# Patient Record
Sex: MALE | Race: WHITE | NOT HISPANIC OR LATINO | Employment: OTHER | URBAN - METROPOLITAN AREA
[De-identification: names, ages, dates, MRNs, and addresses within clinical notes are randomized per-mention and may not be internally consistent; named-entity substitution may affect disease eponyms.]

---

## 2023-06-17 LAB
ALANINE AMINOTRANSFERASE (SGPT) (U/L) IN SER/PLAS: 26 U/L (ref 10–52)
ALBUMIN (G/DL) IN SER/PLAS: 4.3 G/DL (ref 3.4–5)
ALKALINE PHOSPHATASE (U/L) IN SER/PLAS: 51 U/L (ref 33–136)
ANION GAP IN SER/PLAS: 13 MMOL/L (ref 10–20)
APPEARANCE, URINE: NORMAL
ASPARTATE AMINOTRANSFERASE (SGOT) (U/L) IN SER/PLAS: 20 U/L (ref 9–39)
BASOPHILS (10*3/UL) IN BLOOD BY AUTOMATED COUNT: 0.07 X10E9/L (ref 0–0.1)
BASOPHILS/100 LEUKOCYTES IN BLOOD BY AUTOMATED COUNT: 1.1 % (ref 0–2)
BILIRUBIN TOTAL (MG/DL) IN SER/PLAS: 0.4 MG/DL (ref 0–1.2)
BILIRUBIN, URINE: NEGATIVE
BLOOD, URINE: NEGATIVE
CALCIUM (MG/DL) IN SER/PLAS: 9.8 MG/DL (ref 8.6–10.6)
CARBON DIOXIDE, TOTAL (MMOL/L) IN SER/PLAS: 25 MMOL/L (ref 21–32)
CHLORIDE (MMOL/L) IN SER/PLAS: 105 MMOL/L (ref 98–107)
CHOLESTEROL (MG/DL) IN SER/PLAS: 210 MG/DL (ref 0–199)
CHOLESTEROL IN HDL (MG/DL) IN SER/PLAS: 55.6 MG/DL
CHOLESTEROL/HDL RATIO: 3.8
COLOR, URINE: NORMAL
CREATININE (MG/DL) IN SER/PLAS: 1 MG/DL (ref 0.5–1.3)
EOSINOPHILS (10*3/UL) IN BLOOD BY AUTOMATED COUNT: 0.41 X10E9/L (ref 0–0.7)
EOSINOPHILS/100 LEUKOCYTES IN BLOOD BY AUTOMATED COUNT: 6.2 % (ref 0–6)
ERYTHROCYTE DISTRIBUTION WIDTH (RATIO) BY AUTOMATED COUNT: 13 % (ref 11.5–14.5)
ERYTHROCYTE MEAN CORPUSCULAR HEMOGLOBIN CONCENTRATION (G/DL) BY AUTOMATED: 32.2 G/DL (ref 32–36)
ERYTHROCYTE MEAN CORPUSCULAR VOLUME (FL) BY AUTOMATED COUNT: 88 FL (ref 80–100)
ERYTHROCYTES (10*6/UL) IN BLOOD BY AUTOMATED COUNT: 5.18 X10E12/L (ref 4.5–5.9)
GFR MALE: 82 ML/MIN/1.73M2
GLUCOSE (MG/DL) IN SER/PLAS: 119 MG/DL (ref 74–99)
GLUCOSE, URINE: NEGATIVE MG/DL
HEMATOCRIT (%) IN BLOOD BY AUTOMATED COUNT: 45.7 % (ref 41–52)
HEMOGLOBIN (G/DL) IN BLOOD: 14.7 G/DL (ref 13.5–17.5)
IMMATURE GRANULOCYTES/100 LEUKOCYTES IN BLOOD BY AUTOMATED COUNT: 0.3 % (ref 0–0.9)
KETONES, URINE: NEGATIVE MG/DL
LDL: 127 MG/DL (ref 0–99)
LEUKOCYTE ESTERASE, URINE: NEGATIVE
LEUKOCYTES (10*3/UL) IN BLOOD BY AUTOMATED COUNT: 6.6 X10E9/L (ref 4.4–11.3)
LYMPHOCYTES (10*3/UL) IN BLOOD BY AUTOMATED COUNT: 2.05 X10E9/L (ref 1.2–4.8)
LYMPHOCYTES/100 LEUKOCYTES IN BLOOD BY AUTOMATED COUNT: 30.9 % (ref 13–44)
MONOCYTES (10*3/UL) IN BLOOD BY AUTOMATED COUNT: 0.65 X10E9/L (ref 0.1–1)
MONOCYTES/100 LEUKOCYTES IN BLOOD BY AUTOMATED COUNT: 9.8 % (ref 2–10)
NEUTROPHILS (10*3/UL) IN BLOOD BY AUTOMATED COUNT: 3.43 X10E9/L (ref 1.2–7.7)
NEUTROPHILS/100 LEUKOCYTES IN BLOOD BY AUTOMATED COUNT: 51.7 % (ref 40–80)
NITRITE, URINE: NEGATIVE
NRBC (PER 100 WBCS) BY AUTOMATED COUNT: 0 /100 WBC (ref 0–0)
PH, URINE: 5 (ref 5–8)
PLATELETS (10*3/UL) IN BLOOD AUTOMATED COUNT: 246 X10E9/L (ref 150–450)
POTASSIUM (MMOL/L) IN SER/PLAS: 4.3 MMOL/L (ref 3.5–5.3)
PROSTATE SPECIFIC AG (NG/ML) IN SER/PLAS: 0.66 NG/ML (ref 0–4)
PROTEIN TOTAL: 7 G/DL (ref 6.4–8.2)
PROTEIN, URINE: NEGATIVE MG/DL
SODIUM (MMOL/L) IN SER/PLAS: 139 MMOL/L (ref 136–145)
SPECIFIC GRAVITY, URINE: 1.02 (ref 1–1.03)
THYROTROPIN (MIU/L) IN SER/PLAS BY DETECTION LIMIT <= 0.05 MIU/L: 1.66 MIU/L (ref 0.44–3.98)
TRIGLYCERIDE (MG/DL) IN SER/PLAS: 135 MG/DL (ref 0–149)
UREA NITROGEN (MG/DL) IN SER/PLAS: 25 MG/DL (ref 6–23)
UROBILINOGEN, URINE: <2 MG/DL (ref 0–1.9)
VLDL: 27 MG/DL (ref 0–40)

## 2023-06-19 LAB
ESTIMATED AVERAGE GLUCOSE FOR HBA1C: 137 MG/DL
HEMOGLOBIN A1C/HEMOGLOBIN TOTAL IN BLOOD: 6.4 %

## 2023-06-23 LAB
TESTOSTERONE FREE (CHAN): 50 PG/ML (ref 35–155)
TESTOSTERONE,TOTAL,LC-MS/MS: 435 NG/DL (ref 250–1100)

## 2023-11-22 DIAGNOSIS — J31.0 CHRONIC RHINITIS: ICD-10-CM

## 2023-11-22 RX ORDER — FLUTICASONE PROPIONATE 50 MCG
SPRAY, SUSPENSION (ML) NASAL
Qty: 16 G | Refills: 0 | Status: SHIPPED | OUTPATIENT
Start: 2023-11-22 | End: 2024-02-05

## 2023-12-07 ENCOUNTER — OFFICE VISIT (OUTPATIENT)
Dept: PRIMARY CARE | Facility: CLINIC | Age: 68
End: 2023-12-07
Payer: MEDICARE

## 2023-12-07 VITALS
SYSTOLIC BLOOD PRESSURE: 130 MMHG | WEIGHT: 231.4 LBS | TEMPERATURE: 96.9 F | BODY MASS INDEX: 35.18 KG/M2 | OXYGEN SATURATION: 97 % | HEART RATE: 65 BPM | DIASTOLIC BLOOD PRESSURE: 82 MMHG

## 2023-12-07 DIAGNOSIS — R73.02 IGT (IMPAIRED GLUCOSE TOLERANCE): ICD-10-CM

## 2023-12-07 DIAGNOSIS — M25.551 PAIN OF RIGHT HIP: ICD-10-CM

## 2023-12-07 DIAGNOSIS — Z00.00 WELLNESS EXAMINATION: ICD-10-CM

## 2023-12-07 DIAGNOSIS — R73.03 PREDIABETES: ICD-10-CM

## 2023-12-07 DIAGNOSIS — G47.33 OBSTRUCTIVE SLEEP APNEA OF ADULT: ICD-10-CM

## 2023-12-07 DIAGNOSIS — E78.2 MIXED HYPERLIPIDEMIA: Primary | ICD-10-CM

## 2023-12-07 PROBLEM — N52.01 ERECTILE DYSFUNCTION DUE TO ARTERIAL INSUFFICIENCY: Status: ACTIVE | Noted: 2017-04-26

## 2023-12-07 PROBLEM — H90.3 ASYMMETRICAL SENSORINEURAL HEARING LOSS: Status: ACTIVE | Noted: 2023-12-07

## 2023-12-07 PROBLEM — N42.9 DISEASE OF PROSTATE: Status: ACTIVE | Noted: 2021-12-13

## 2023-12-07 PROCEDURE — 1159F MED LIST DOCD IN RCRD: CPT | Performed by: INTERNAL MEDICINE

## 2023-12-07 PROCEDURE — 99213 OFFICE O/P EST LOW 20 MIN: CPT | Performed by: INTERNAL MEDICINE

## 2023-12-07 RX ORDER — VENLAFAXINE HYDROCHLORIDE 75 MG/1
75 CAPSULE, EXTENDED RELEASE ORAL DAILY
COMMUNITY
Start: 2023-12-04

## 2023-12-07 RX ORDER — ERGOCALCIFEROL 1.25 MG/1
50000 CAPSULE ORAL
COMMUNITY

## 2023-12-07 RX ORDER — EZETIMIBE 10 MG/1
10 TABLET ORAL DAILY
Qty: 90 TABLET | Refills: 1 | Status: SHIPPED | OUTPATIENT
Start: 2023-12-07

## 2023-12-07 RX ORDER — VENLAFAXINE HYDROCHLORIDE 150 MG/1
150 CAPSULE, EXTENDED RELEASE ORAL 2 TIMES DAILY
COMMUNITY

## 2023-12-07 RX ORDER — FLUTICASONE PROPIONATE 50 MCG
2 SPRAY, SUSPENSION (ML) NASAL DAILY
COMMUNITY
Start: 2018-04-12

## 2023-12-07 RX ORDER — TADALAFIL 5 MG/1
5 TABLET ORAL
COMMUNITY
Start: 2011-11-29 | End: 2023-12-07 | Stop reason: SDUPTHER

## 2023-12-07 RX ORDER — EZETIMIBE 10 MG/1
10 TABLET ORAL DAILY
COMMUNITY
End: 2023-12-07 | Stop reason: SDUPTHER

## 2023-12-07 RX ORDER — OMEGA-3/DHA/EPA/FISH OIL 300-1000MG
CAPSULE,DELAYED RELEASE (ENTERIC COATED) ORAL
COMMUNITY
Start: 2012-10-02

## 2023-12-07 RX ORDER — TADALAFIL 5 MG/1
5 TABLET ORAL
Qty: 90 TABLET | Refills: 3 | Status: SHIPPED | OUTPATIENT
Start: 2023-12-07 | End: 2036-01-24

## 2023-12-07 NOTE — PROGRESS NOTES
Subjective   Patient ID: Hernando Stone is a 68 y.o. male who presents for Follow-up (6 mo).    HPI lipids  Sugars   Ed  Rhinitis   Sp carpal       Review of Systems    Objective   /82   Pulse 65   Temp 36.1 °C (96.9 °F)   Wt 105 kg (231 lb 6.4 oz)   SpO2 97%   BMI 35.18 kg/m²     Physical Exam  Constitutional:       Appearance: Normal appearance. He is normal weight.   HENT:      Head: Normocephalic.      Right Ear: External ear normal.      Left Ear: External ear normal.      Nose: Nose normal.   Cardiovascular:      Rate and Rhythm: Normal rate and regular rhythm.      Pulses: Normal pulses.   Pulmonary:      Effort: Pulmonary effort is normal.      Breath sounds: Normal breath sounds.   Musculoskeletal:      Cervical back: Normal range of motion.   Neurological:      Mental Status: He is alert.         Assessment/Plan   Diagnoses and all orders for this visit:  Mixed hyperlipidemia  IGT (impaired glucose tolerance)  Pain of right hip  Obstructive sleep apnea of adult  Comments:  on cpap

## 2023-12-14 ENCOUNTER — LAB (OUTPATIENT)
Dept: LAB | Facility: LAB | Age: 68
End: 2023-12-14
Payer: MEDICARE

## 2023-12-14 ENCOUNTER — ANCILLARY PROCEDURE (OUTPATIENT)
Dept: RADIOLOGY | Facility: CLINIC | Age: 68
End: 2023-12-14
Payer: MEDICARE

## 2023-12-14 DIAGNOSIS — M25.551 PAIN OF RIGHT HIP: ICD-10-CM

## 2023-12-14 DIAGNOSIS — Z00.00 WELLNESS EXAMINATION: ICD-10-CM

## 2023-12-14 DIAGNOSIS — E78.2 MIXED HYPERLIPIDEMIA: ICD-10-CM

## 2023-12-14 DIAGNOSIS — R73.03 PREDIABETES: ICD-10-CM

## 2023-12-14 DIAGNOSIS — R73.02 IGT (IMPAIRED GLUCOSE TOLERANCE): ICD-10-CM

## 2023-12-14 DIAGNOSIS — G47.33 OBSTRUCTIVE SLEEP APNEA OF ADULT: ICD-10-CM

## 2023-12-14 PROCEDURE — 80053 COMPREHEN METABOLIC PANEL: CPT

## 2023-12-14 PROCEDURE — 80061 LIPID PANEL: CPT

## 2023-12-14 PROCEDURE — 36415 COLL VENOUS BLD VENIPUNCTURE: CPT

## 2023-12-14 PROCEDURE — 83036 HEMOGLOBIN GLYCOSYLATED A1C: CPT

## 2023-12-14 PROCEDURE — 73502 X-RAY EXAM HIP UNI 2-3 VIEWS: CPT | Mod: RIGHT SIDE | Performed by: RADIOLOGY

## 2023-12-14 PROCEDURE — 73502 X-RAY EXAM HIP UNI 2-3 VIEWS: CPT | Mod: RT,FY

## 2023-12-15 LAB
ALBUMIN SERPL BCP-MCNC: 4.5 G/DL (ref 3.4–5)
ALP SERPL-CCNC: 46 U/L (ref 33–136)
ALT SERPL W P-5'-P-CCNC: 31 U/L (ref 10–52)
ANION GAP SERPL CALC-SCNC: 13 MMOL/L (ref 10–20)
AST SERPL W P-5'-P-CCNC: 26 U/L (ref 9–39)
BILIRUB SERPL-MCNC: 0.5 MG/DL (ref 0–1.2)
BUN SERPL-MCNC: 22 MG/DL (ref 6–23)
CALCIUM SERPL-MCNC: 9.7 MG/DL (ref 8.6–10.6)
CHLORIDE SERPL-SCNC: 103 MMOL/L (ref 98–107)
CHOLEST SERPL-MCNC: 204 MG/DL (ref 0–199)
CHOLESTEROL/HDL RATIO: 3.4
CO2 SERPL-SCNC: 28 MMOL/L (ref 21–32)
CREAT SERPL-MCNC: 1.04 MG/DL (ref 0.5–1.3)
EST. AVERAGE GLUCOSE BLD GHB EST-MCNC: 137 MG/DL
GFR SERPL CREATININE-BSD FRML MDRD: 78 ML/MIN/1.73M*2
GLUCOSE SERPL-MCNC: 100 MG/DL (ref 74–99)
HBA1C MFR BLD: 6.4 %
HDLC SERPL-MCNC: 60.4 MG/DL
LDLC SERPL CALC-MCNC: 122 MG/DL
NON HDL CHOLESTEROL: 144 MG/DL (ref 0–149)
POTASSIUM SERPL-SCNC: 4.3 MMOL/L (ref 3.5–5.3)
PROT SERPL-MCNC: 7.2 G/DL (ref 6.4–8.2)
SODIUM SERPL-SCNC: 140 MMOL/L (ref 136–145)
TRIGL SERPL-MCNC: 106 MG/DL (ref 0–149)
VLDL: 21 MG/DL (ref 0–40)

## 2023-12-18 ENCOUNTER — TELEPHONE (OUTPATIENT)
Dept: PRIMARY CARE | Facility: CLINIC | Age: 68
End: 2023-12-18
Payer: MEDICARE

## 2023-12-18 NOTE — TELEPHONE ENCOUNTER
----- Message from Julio Cesar Miguel MD sent at 12/18/2023  9:46 AM EST -----  Labs are normal x his sugar is up

## 2024-02-03 DIAGNOSIS — J31.0 CHRONIC RHINITIS: ICD-10-CM

## 2024-02-05 RX ORDER — FLUTICASONE PROPIONATE 50 MCG
SPRAY, SUSPENSION (ML) NASAL
Qty: 16 G | Refills: 0 | Status: SHIPPED | OUTPATIENT
Start: 2024-02-05 | End: 2024-03-25

## 2024-03-23 DIAGNOSIS — J31.0 CHRONIC RHINITIS: ICD-10-CM

## 2024-03-25 RX ORDER — FLUTICASONE PROPIONATE 50 MCG
SPRAY, SUSPENSION (ML) NASAL
Qty: 16 G | Refills: 0 | Status: SHIPPED | OUTPATIENT
Start: 2024-03-25 | End: 2024-05-06

## 2024-05-06 DIAGNOSIS — J31.0 CHRONIC RHINITIS: ICD-10-CM

## 2024-05-06 RX ORDER — FLUTICASONE PROPIONATE 50 MCG
SPRAY, SUSPENSION (ML) NASAL
Qty: 16 G | Refills: 0 | Status: SHIPPED | OUTPATIENT
Start: 2024-05-06 | End: 2024-05-31

## 2024-05-31 DIAGNOSIS — J31.0 CHRONIC RHINITIS: ICD-10-CM

## 2024-05-31 RX ORDER — FLUTICASONE PROPIONATE 50 MCG
SPRAY, SUSPENSION (ML) NASAL
Qty: 16 G | Refills: 0 | Status: SHIPPED | OUTPATIENT
Start: 2024-05-31

## 2024-06-06 ENCOUNTER — APPOINTMENT (OUTPATIENT)
Dept: PRIMARY CARE | Facility: CLINIC | Age: 69
End: 2024-06-06
Payer: MEDICARE

## 2024-07-01 DIAGNOSIS — J31.0 CHRONIC RHINITIS: ICD-10-CM

## 2024-07-01 RX ORDER — FLUTICASONE PROPIONATE 50 MCG
SPRAY, SUSPENSION (ML) NASAL
Qty: 16 G | Refills: 3 | Status: SHIPPED | OUTPATIENT
Start: 2024-07-01

## 2024-07-18 ENCOUNTER — APPOINTMENT (OUTPATIENT)
Dept: PRIMARY CARE | Facility: CLINIC | Age: 69
End: 2024-07-18
Payer: MEDICARE

## 2024-07-18 ENCOUNTER — LAB (OUTPATIENT)
Dept: LAB | Facility: LAB | Age: 69
End: 2024-07-18
Payer: MEDICARE

## 2024-07-18 VITALS
HEART RATE: 71 BPM | OXYGEN SATURATION: 95 % | DIASTOLIC BLOOD PRESSURE: 82 MMHG | WEIGHT: 225 LBS | HEIGHT: 68 IN | SYSTOLIC BLOOD PRESSURE: 126 MMHG | BODY MASS INDEX: 34.1 KG/M2

## 2024-07-18 DIAGNOSIS — F32.A DEPRESSIVE DISORDER: ICD-10-CM

## 2024-07-18 DIAGNOSIS — E66.01 OBESITY, MORBID (MULTI): ICD-10-CM

## 2024-07-18 DIAGNOSIS — N52.01 ERECTILE DYSFUNCTION DUE TO ARTERIAL INSUFFICIENCY: ICD-10-CM

## 2024-07-18 DIAGNOSIS — R73.02 IGT (IMPAIRED GLUCOSE TOLERANCE): ICD-10-CM

## 2024-07-18 DIAGNOSIS — Z00.00 MEDICARE ANNUAL WELLNESS VISIT, SUBSEQUENT: Primary | ICD-10-CM

## 2024-07-18 DIAGNOSIS — Z00.00 WELLNESS EXAMINATION: ICD-10-CM

## 2024-07-18 DIAGNOSIS — H90.3 ASYMMETRICAL SENSORINEURAL HEARING LOSS: ICD-10-CM

## 2024-07-18 DIAGNOSIS — Z12.5 PROSTATE CANCER SCREENING: ICD-10-CM

## 2024-07-18 DIAGNOSIS — E78.2 MIXED HYPERLIPIDEMIA: ICD-10-CM

## 2024-07-18 DIAGNOSIS — G47.33 OBSTRUCTIVE SLEEP APNEA OF ADULT: ICD-10-CM

## 2024-07-18 DIAGNOSIS — Z00.00 MEDICARE ANNUAL WELLNESS VISIT, SUBSEQUENT: ICD-10-CM

## 2024-07-18 DIAGNOSIS — F33.42 MAJOR DEPRESSIVE DISORDER, RECURRENT, IN FULL REMISSION (CMS-HCC): ICD-10-CM

## 2024-07-18 LAB — POC HEMOGLOBIN A1C: 6 % (ref 4.2–6.5)

## 2024-07-18 PROCEDURE — 81003 URINALYSIS AUTO W/O SCOPE: CPT

## 2024-07-18 PROCEDURE — 85027 COMPLETE CBC AUTOMATED: CPT

## 2024-07-18 PROCEDURE — 84443 ASSAY THYROID STIM HORMONE: CPT

## 2024-07-18 PROCEDURE — G0103 PSA SCREENING: HCPCS

## 2024-07-18 PROCEDURE — 3008F BODY MASS INDEX DOCD: CPT | Performed by: INTERNAL MEDICINE

## 2024-07-18 PROCEDURE — 1160F RVW MEDS BY RX/DR IN RCRD: CPT | Performed by: INTERNAL MEDICINE

## 2024-07-18 PROCEDURE — 83036 HEMOGLOBIN GLYCOSYLATED A1C: CPT | Performed by: INTERNAL MEDICINE

## 2024-07-18 PROCEDURE — 1159F MED LIST DOCD IN RCRD: CPT | Performed by: INTERNAL MEDICINE

## 2024-07-18 PROCEDURE — G0439 PPPS, SUBSEQ VISIT: HCPCS | Performed by: INTERNAL MEDICINE

## 2024-07-18 PROCEDURE — 99213 OFFICE O/P EST LOW 20 MIN: CPT | Performed by: INTERNAL MEDICINE

## 2024-07-18 PROCEDURE — 36415 COLL VENOUS BLD VENIPUNCTURE: CPT

## 2024-07-18 ASSESSMENT — ENCOUNTER SYMPTOMS
CHOKING: 0
PSYCHIATRIC NEGATIVE: 1
EYE PAIN: 0
STRIDOR: 0
DIAPHORESIS: 0
CHILLS: 0
WHEEZING: 0
EYE ITCHING: 0
APPETITE CHANGE: 0
CHEST TIGHTNESS: 0
EYE DISCHARGE: 0
FEVER: 0
SHORTNESS OF BREATH: 0
LIGHT-HEADEDNESS: 0
PHOTOPHOBIA: 0
DIZZINESS: 0
HEADACHES: 0
FACIAL ASYMMETRY: 0
NUMBNESS: 0
ENDOCRINE NEGATIVE: 1
APNEA: 0
ARTHRALGIAS: 0
PALPITATIONS: 0
EYE REDNESS: 0
FATIGUE: 0
GASTROINTESTINAL NEGATIVE: 1
HEMATOLOGIC/LYMPHATIC NEGATIVE: 1
ACTIVITY CHANGE: 0
UNEXPECTED WEIGHT CHANGE: 0
ALLERGIC/IMMUNOLOGIC NEGATIVE: 1
COUGH: 0

## 2024-07-18 NOTE — PROGRESS NOTES
Subjective   Reason for Visit: Hernando Stone is an 69 y.o. male here for a Medicare Wellness visit.     Past Medical, Surgical, and Family History reviewed and updated in chart.    Reviewed all medications by prescribing practitioner or clinical pharmacist (such as prescriptions, OTCs, herbal therapies and supplements) and documented in the medical record.    HPI STABLE WITH WEIGHT GAIN  MOOD IS OK   ACTIVE NOT MUCH  DIET IS NOT GREAT  NO HEART SX.       Past Medical History:   Past Medical History:   Diagnosis Date   Depression   Hyperlipemia   OBESE  DEPRESSION  BPH     Past Surgical History:   Procedure Laterality Date   COLONOSCOPY 2017   HAND DEBRIDEMENT Right 11/04/2023   Irrigation and Debridement Right Hand - Right   TONSILLECTOMY (HISTORICAL)   CARPAL TUNNEL OCT 2023    Social History:  Social History     Socioeconomic History   Marital status:    Spouse name: Not on file   Number of children: Not on file   Years of education: Not on file   Highest education level: Not on file   Occupational History   Not on file   Tobacco Use   Smoking status: Light Smoker   Smokeless tobacco: Never   Substance and Sexual Activity   Alcohol use: Yes   Drug use: No   Sexual activity: Not on file   Other Topics Concern   Not on file   Social History Narrative   Not on file     Social Determinants of Health     Financial Resource Strain: Not on file   Food Insecurity: Not on file   Transportation Needs: Not on file   Physical Activity: Not on file   Stress: Not on file   Social Connections: Not on file   Intimate Partner Violence: Not on file   Housing Stability: Not on file     Family History:  Family History   Problem Relation Name Age of Onset   Cancer Mother   Cancer Father   colon and stomach       Medications  Prior to Admission medications   Medication Sig Start Date End Date Taking? Authorizing Provider   ascorbic acid (Vitamin C) 250 MG tablet Take 250 mg by mouth in the morning. Yes Historical Provider, MD  "  ergocalciferol (Vitamin D-2) 1.25 MG (60685 UT) capsule Take 50,000 Units by mouth 1 (one) time per week. Yes Historical Provider, MD   ezetimibe (Zetia) 10 MG tablet 4/5/17 Yes Historical Provider, MD   tadalafil (Cialis) 5 MG tablet cialis 5mg 1 po daily 12/13/21 Yes Historical Provider, MD     Preventive  medicine:  Mammogram  Dexa  Psa 2024  Colonoscopy 2023  Hep c      Patient Care Team:  Julio Cesar Miguel MD as PCP - General  Julio Cesar Miguel MD as PCP - [facility] Attributed Provider     Review of Systems   Constitutional:  Negative for activity change, appetite change, chills, diaphoresis, fatigue, fever and unexpected weight change.   HENT: Negative.     Eyes:  Negative for photophobia, pain, discharge, redness, itching and visual disturbance.   Respiratory:  Negative for apnea, cough, choking, chest tightness, shortness of breath, wheezing and stridor.    Cardiovascular:  Negative for chest pain, palpitations and leg swelling.   Gastrointestinal: Negative.    Endocrine: Negative.    Genitourinary: Negative.    Musculoskeletal:  Negative for arthralgias.   Skin: Negative.    Allergic/Immunologic: Negative.    Neurological:  Negative for dizziness, facial asymmetry, light-headedness, numbness and headaches.   Hematological: Negative.    Psychiatric/Behavioral: Negative.         Objective   Vitals:  /82   Pulse 71   Ht 1.727 m (5' 8\")   Wt 102 kg (225 lb)   SpO2 95%   BMI 34.21 kg/m²       Physical Exam  Constitutional:       Appearance: Normal appearance. He is obese.   HENT:      Head: Normocephalic and atraumatic.      Right Ear: Tympanic membrane normal.      Left Ear: Tympanic membrane normal.      Nose: Nose normal.   Eyes:      Extraocular Movements: Extraocular movements intact.      Conjunctiva/sclera: Conjunctivae normal.      Pupils: Pupils are equal, round, and reactive to light.   Cardiovascular:      Rate and Rhythm: Normal rate and regular rhythm.      Pulses: Normal pulses.      Heart " sounds: Normal heart sounds.   Pulmonary:      Effort: Pulmonary effort is normal.      Breath sounds: Normal breath sounds.   Abdominal:      General: Abdomen is flat. Bowel sounds are normal.      Palpations: Abdomen is soft.   Musculoskeletal:         General: Normal range of motion.      Cervical back: Normal range of motion and neck supple.   Skin:     General: Skin is warm and dry.   Neurological:      General: No focal deficit present.      Mental Status: He is oriented to person, place, and time. Mental status is at baseline.   Psychiatric:         Mood and Affect: Mood normal.         Behavior: Behavior normal.         Thought Content: Thought content normal.         Judgment: Judgment normal.       Assessment/Plan   Problem List Items Addressed This Visit       Asymmetrical sensorineural hearing loss    Current Assessment & Plan     STABLE         Relevant Orders    TSH with reflex to Free T4 if abnormal    Urinalysis with Reflex Culture and Microscopic    POCT glycosylated hemoglobin (Hb A1C) manually resulted    Uric Acid    Depressive disorder    Current Assessment & Plan     SEES PSYCH         Relevant Orders    TSH with reflex to Free T4 if abnormal    Urinalysis with Reflex Culture and Microscopic    POCT glycosylated hemoglobin (Hb A1C) manually resulted    Uric Acid    Erectile dysfunction due to arterial insufficiency    Current Assessment & Plan     OK         Relevant Orders    TSH with reflex to Free T4 if abnormal    Urinalysis with Reflex Culture and Microscopic    POCT glycosylated hemoglobin (Hb A1C) manually resulted    Uric Acid    Hyperlipidemia    Current Assessment & Plan     CONTROLLED         Relevant Orders    TSH with reflex to Free T4 if abnormal    Urinalysis with Reflex Culture and Microscopic    POCT glycosylated hemoglobin (Hb A1C) manually resulted    Uric Acid    Obstructive sleep apnea of adult    Current Assessment & Plan     CPAP         Relevant Orders    TSH with reflex  to Free T4 if abnormal    Urinalysis with Reflex Culture and Microscopic    POCT glycosylated hemoglobin (Hb A1C) manually resulted    Uric Acid    IGT (impaired glucose tolerance)    Current Assessment & Plan     FOLLOW AIC         Relevant Orders    CBC    TSH with reflex to Free T4 if abnormal    Urinalysis with Reflex Culture and Microscopic    POCT glycosylated hemoglobin (Hb A1C) manually resulted    Uric Acid    Medicare annual wellness visit, subsequent - Primary    Relevant Orders    TSH with reflex to Free T4 if abnormal    Urinalysis with Reflex Culture and Microscopic    POCT glycosylated hemoglobin (Hb A1C) manually resulted    Uric Acid    Major depressive disorder, recurrent, in full remission (CMS-HCC)    Relevant Orders    Uric Acid    Obesity, morbid (Multi)    Current Assessment & Plan     WE DID DISCUSS DIET CARDIO AND CARBS         Relevant Orders    Uric Acid     Other Visit Diagnoses       Prostate cancer screening        Relevant Orders    Prostate Specific Antigen, Screen    TSH with reflex to Free T4 if abnormal    Urinalysis with Reflex Culture and Microscopic    POCT glycosylated hemoglobin (Hb A1C) manually resulted    Uric Acid

## 2024-07-19 LAB
ALBUMIN SERPL BCP-MCNC: 4.3 G/DL (ref 3.4–5)
ALP SERPL-CCNC: 47 U/L (ref 33–136)
ALT SERPL W P-5'-P-CCNC: 31 U/L (ref 10–52)
ANION GAP SERPL CALC-SCNC: 11 MMOL/L (ref 10–20)
APPEARANCE UR: ABNORMAL
AST SERPL W P-5'-P-CCNC: 25 U/L (ref 9–39)
BILIRUB SERPL-MCNC: 0.4 MG/DL (ref 0–1.2)
BILIRUB UR STRIP.AUTO-MCNC: NEGATIVE MG/DL
BUN SERPL-MCNC: 20 MG/DL (ref 6–23)
CALCIUM SERPL-MCNC: 9.4 MG/DL (ref 8.6–10.6)
CHLORIDE SERPL-SCNC: 104 MMOL/L (ref 98–107)
CHOLEST SERPL-MCNC: 197 MG/DL (ref 0–199)
CHOLESTEROL/HDL RATIO: 3.2
CO2 SERPL-SCNC: 27 MMOL/L (ref 21–32)
COLOR UR: YELLOW
CREAT SERPL-MCNC: 1.03 MG/DL (ref 0.5–1.3)
EGFRCR SERPLBLD CKD-EPI 2021: 79 ML/MIN/1.73M*2
ERYTHROCYTE [DISTWIDTH] IN BLOOD BY AUTOMATED COUNT: 13.2 % (ref 11.5–14.5)
GLUCOSE SERPL-MCNC: 122 MG/DL (ref 74–99)
GLUCOSE UR STRIP.AUTO-MCNC: NORMAL MG/DL
HCT VFR BLD AUTO: 44.3 % (ref 41–52)
HDLC SERPL-MCNC: 61.9 MG/DL
HGB BLD-MCNC: 14.2 G/DL (ref 13.5–17.5)
HOLD SPECIMEN: NORMAL
KETONES UR STRIP.AUTO-MCNC: NEGATIVE MG/DL
LDLC SERPL CALC-MCNC: 114 MG/DL
LEUKOCYTE ESTERASE UR QL STRIP.AUTO: NEGATIVE
MCH RBC QN AUTO: 28.1 PG (ref 26–34)
MCHC RBC AUTO-ENTMCNC: 32.1 G/DL (ref 32–36)
MCV RBC AUTO: 88 FL (ref 80–100)
NITRITE UR QL STRIP.AUTO: NEGATIVE
NON HDL CHOLESTEROL: 135 MG/DL (ref 0–149)
NRBC BLD-RTO: 0 /100 WBCS (ref 0–0)
PH UR STRIP.AUTO: 5.5 [PH]
PLATELET # BLD AUTO: 237 X10*3/UL (ref 150–450)
POTASSIUM SERPL-SCNC: 4.3 MMOL/L (ref 3.5–5.3)
PROT SERPL-MCNC: 6.8 G/DL (ref 6.4–8.2)
PROT UR STRIP.AUTO-MCNC: NEGATIVE MG/DL
PSA SERPL-MCNC: 0.9 NG/ML
RBC # BLD AUTO: 5.05 X10*6/UL (ref 4.5–5.9)
RBC # UR STRIP.AUTO: NEGATIVE /UL
SODIUM SERPL-SCNC: 138 MMOL/L (ref 136–145)
SP GR UR STRIP.AUTO: 1.03
TRIGL SERPL-MCNC: 105 MG/DL (ref 0–149)
TSH SERPL-ACNC: 1.59 MIU/L (ref 0.44–3.98)
URATE SERPL-MCNC: 7.4 MG/DL (ref 4–7.5)
UROBILINOGEN UR STRIP.AUTO-MCNC: NORMAL MG/DL
VLDL: 21 MG/DL (ref 0–40)
WBC # BLD AUTO: 6.9 X10*3/UL (ref 4.4–11.3)

## 2024-08-01 ENCOUNTER — TELEPHONE (OUTPATIENT)
Dept: PRIMARY CARE | Facility: CLINIC | Age: 69
End: 2024-08-01
Payer: MEDICARE

## 2024-08-01 NOTE — TELEPHONE ENCOUNTER
----- Message from Julio Cesar Miguel sent at 8/1/2024  9:35 AM EDT -----  Labs are normal except sugar and high uric acid

## 2024-08-01 NOTE — TELEPHONE ENCOUNTER
Result Communication    Resulted Orders   CBC   Result Value Ref Range    WBC 6.9 4.4 - 11.3 x10*3/uL    nRBC 0.0 0.0 - 0.0 /100 WBCs    RBC 5.05 4.50 - 5.90 x10*6/uL    Hemoglobin 14.2 13.5 - 17.5 g/dL    Hematocrit 44.3 41.0 - 52.0 %    MCV 88 80 - 100 fL    MCH 28.1 26.0 - 34.0 pg    MCHC 32.1 32.0 - 36.0 g/dL    RDW 13.2 11.5 - 14.5 %    Platelets 237 150 - 450 x10*3/uL   Lipid Panel   Result Value Ref Range    Cholesterol 197 0 - 199 mg/dL      Comment:            Age      Desirable   Borderline High   High     0-19 Y     0 - 169       170 - 199     >/= 200    20-24 Y     0 - 189       190 - 224     >/= 225         >24 Y     0 - 199       200 - 239     >/= 240   **All ranges are based on fasting samples. Specific   therapeutic targets will vary based on patient-specific   cardiac risk.    Pediatric guidelines reference:Pediatrics 2011, 128(S5).Adult guidelines reference: NCEP ATPIII Guidelines,SHARI 2001, 258:2486-97    Venipuncture immediately after or during the administration of Metamizole may lead to falsely low results. Testing should be performed immediately prior to Metamizole dosing.    HDL-Cholesterol 61.9 mg/dL      Comment:        Age       Very Low   Low     Normal    High    0-19 Y    < 35      < 40     40-45     ----  20-24 Y    ----     < 40      >45      ----        >24 Y      ----     < 40     40-60      >60      Cholesterol/HDL Ratio 3.2       Comment:        Ref Values  Desirable  < 3.4  High Risk  > 5.0    LDL Calculated 114 (H) <=99 mg/dL      Comment:                                  Near   Borderline      AGE      Desirable  Optimal    High     High     Very High     0-19 Y     0 - 109     ---    110-129   >/= 130     ----    20-24 Y     0 - 119     ---    120-159   >/= 160     ----      >24 Y     0 -  99   100-129  130-159   160-189     >/=190      VLDL 21 0 - 40 mg/dL    Triglycerides 105 0 - 149 mg/dL      Comment:         Age         Desirable   Borderline High   High     Very High   0  D-90 D    19 - 174         ----         ----        ----  91 D- 9 Y     0 -  74        75 -  99     >/= 100      ----    10-19 Y     0 -  89        90 - 129     >/= 130      ----    20-24 Y     0 - 114       115 - 149     >/= 150      ----         >24 Y     0 - 149       150 - 199    200- 499    >/= 500    Venipuncture immediately after or during the administration of Metamizole may lead to falsely low results. Testing should be performed immediately prior to Metamizole dosing.    Non HDL Cholesterol 135 0 - 149 mg/dL      Comment:            Age       Desirable   Borderline High   High     Very High     0-19 Y     0 - 119       120 - 144     >/= 145    >/= 160    20-24 Y     0 - 149       150 - 189     >/= 190      ----         >24 Y    30 mg/dL above LDL Cholesterol goal     Comprehensive Metabolic Panel   Result Value Ref Range    Glucose 122 (H) 74 - 99 mg/dL    Sodium 138 136 - 145 mmol/L    Potassium 4.3 3.5 - 5.3 mmol/L    Chloride 104 98 - 107 mmol/L    Bicarbonate 27 21 - 32 mmol/L    Anion Gap 11 10 - 20 mmol/L    Urea Nitrogen 20 6 - 23 mg/dL    Creatinine 1.03 0.50 - 1.30 mg/dL    eGFR 79 >60 mL/min/1.73m*2      Comment:      Calculations of estimated GFR are performed using the 2021 CKD-EPI Study Refit equation without the race variable for the IDMS-Traceable creatinine methods.  https://jasn.asnjournals.org/content/early/2021/09/22/ASN.5928287289    Calcium 9.4 8.6 - 10.6 mg/dL    Albumin 4.3 3.4 - 5.0 g/dL    Alkaline Phosphatase 47 33 - 136 U/L    Total Protein 6.8 6.4 - 8.2 g/dL    AST 25 9 - 39 U/L    Bilirubin, Total 0.4 0.0 - 1.2 mg/dL    ALT 31 10 - 52 U/L      Comment:      Patients treated with Sulfasalazine may generate falsely decreased results for ALT.   Prostate Specific Antigen, Screen   Result Value Ref Range    Prostate Specific Antigen,Screen 0.90 <=4.00 ng/mL    Narrative    The FDA requires that the method used for PSA assay be reported to the physician. Values obtained with  different assay methods must not be used interchangeably. This test was performed at [facility] using Siemens AtellNovel SuperTV PSA method, which is a sandwich immunoassay using chemiluminescence for quantitation. The assay is approved for measurement of prostate-specific antigen (PSA) in serum and may be used in conjunction with a digital rectal examination in men 50 years and older as an aid in the detection of prostate cancer. 5 Alpha-reductase inhibitors (e.g., Proscar, Finasteride, Avodart, Dutasteride, and Sarah Beth) for the treatment of BPH have been shown to lower PSA levels by an average of 50% after 6 months of treatment.        TSH with reflex to Free T4 if abnormal   Result Value Ref Range    Thyroid Stimulating Hormone 1.59 0.44 - 3.98 mIU/L    Narrative    TSH testing is performed using different testing methodology at [facility] than at other [facility]. Direct result comparisons should only be made within the same method.     Uric Acid   Result Value Ref Range    Uric Acid 7.4 4.0 - 7.5 mg/dL      Comment:      Venipuncture immediately after or during the administration of Metamizole may lead to falsely low results. Testing should be performed immediately  prior to Metamizole dosing.   Urinalysis with Reflex Culture and Microscopic   Result Value Ref Range    Color, Urine Yellow Light-Yellow, Yellow, Dark-Yellow    Appearance, Urine Ex.Turbid (N) Clear    Specific Gravity, Urine 1.026 1.005 - 1.035    pH, Urine 5.5 5.0, 5.5, 6.0, 6.5, 7.0, 7.5, 8.0    Protein, Urine NEGATIVE NEGATIVE, 10 (TRACE), 20 (TRACE) mg/dL    Glucose, Urine Normal Normal mg/dL    Blood, Urine NEGATIVE NEGATIVE    Ketones, Urine NEGATIVE NEGATIVE mg/dL    Bilirubin, Urine NEGATIVE NEGATIVE    Urobilinogen, Urine Normal Normal mg/dL    Nitrite, Urine NEGATIVE NEGATIVE    Leukocyte Esterase, Urine NEGATIVE NEGATIVE   Extra Urine Gray Tube   Result Value Ref Range    Extra Tube Hold for add-ons.        Comment:      Auto resulted.       9:42 AM      Results {WERE / WERE NOT:46226} successfully communicated with the {RHEUM PARENT/PATIENT:86223} and they {AMB Acknowledged/Did Not Acknowledge:82336} their understanding.

## 2024-08-05 DIAGNOSIS — Z00.00 WELLNESS EXAMINATION: ICD-10-CM

## 2024-08-05 DIAGNOSIS — R73.03 PREDIABETES: ICD-10-CM

## 2024-08-05 DIAGNOSIS — E78.2 MIXED HYPERLIPIDEMIA: ICD-10-CM

## 2024-08-05 DIAGNOSIS — G47.33 OBSTRUCTIVE SLEEP APNEA OF ADULT: ICD-10-CM

## 2024-08-05 DIAGNOSIS — M25.551 PAIN OF RIGHT HIP: ICD-10-CM

## 2024-08-05 DIAGNOSIS — R73.02 IGT (IMPAIRED GLUCOSE TOLERANCE): ICD-10-CM

## 2024-08-05 RX ORDER — EZETIMIBE 10 MG/1
10 TABLET ORAL DAILY
Qty: 30 TABLET | Refills: 0 | Status: SHIPPED | OUTPATIENT
Start: 2024-08-05

## 2024-08-21 ENCOUNTER — TELEMEDICINE (OUTPATIENT)
Dept: PRIMARY CARE | Facility: CLINIC | Age: 69
End: 2024-08-21
Payer: MEDICARE

## 2024-08-21 DIAGNOSIS — U07.1 COVID-19: Primary | ICD-10-CM

## 2024-08-21 PROCEDURE — 99213 OFFICE O/P EST LOW 20 MIN: CPT | Performed by: INTERNAL MEDICINE

## 2024-08-21 ASSESSMENT — ENCOUNTER SYMPTOMS
COUGH: 1
HEARTBURN: 0
SWEATS: 1
MYALGIAS: 0
HEMOPTYSIS: 0
HEADACHES: 1
SORE THROAT: 1
SHORTNESS OF BREATH: 1
WHEEZING: 0
WEIGHT LOSS: 0
CHILLS: 1
RHINORRHEA: 1
FEVER: 1

## 2024-08-21 NOTE — PROGRESS NOTES
Virtual or Telephone Consent    An interactive audio and video telecommunication system which permits real time communications between the patient (at the originating site) and provider (at the distant site) was utilized to provide this telehealth service.   Verbal consent was requested and obtained from Hernando Stone on this date, 08/21/24 for a telehealth visit.     Subjective   Hernando Stone is a 69 y.o. male who presents for evaluation of symptoms of a URI, Covid 19 . Symptoms include achiness, congestion, no  fever, and sore throat. Onset of symptoms was 5 days ago and has been gradually improving since that time. Treatment to date: none.  He testedCovid 19 positive yesterday , but feeling sick since last Friday. He smokes Cigar sometimes.    Objective   Physical Exam   This is Tele phone call visit    Assessment/Plan   viral upper respiratory illness and Covid 19 .    Discussed diagnosis and treatment of URI.  Suggested symptomatic OTC remedies.Hydration   Nasal saline spray for congestion, decongestants, mucinex  Contact precautions, wear maskMonitor temp, pulse Ox  Get Covid booster at pharmacy  Answers submitted by the patient for this visit:  Cough Questionnaire (Submitted on 8/21/2024)  Chief Complaint: Cough  Chronicity: new  Onset: in the past 7 days  Progression since onset: waxing and waning  Frequency: every few minutes  Cough characteristics: productive of brown sputum  chest pain: No  chills: Yes  ear congestion: Yes  ear pain: No  fever: Yes  headaches: Yes  heartburn: No  hemoptysis: No  myalgias: No  nasal congestion: Yes  postnasal drip: Yes  rash: No  rhinorrhea: Yes  shortness of breath: Yes  sore throat: Yes  sweats: Yes  weight loss: No  wheezing: No  Aggravated by: nothing

## 2024-08-27 ENCOUNTER — APPOINTMENT (OUTPATIENT)
Dept: AUDIOLOGY | Facility: CLINIC | Age: 69
End: 2024-08-27
Payer: MEDICARE

## 2024-08-31 DIAGNOSIS — G47.33 OBSTRUCTIVE SLEEP APNEA OF ADULT: ICD-10-CM

## 2024-08-31 DIAGNOSIS — Z00.00 WELLNESS EXAMINATION: ICD-10-CM

## 2024-08-31 DIAGNOSIS — R73.03 PREDIABETES: ICD-10-CM

## 2024-08-31 DIAGNOSIS — E78.2 MIXED HYPERLIPIDEMIA: ICD-10-CM

## 2024-08-31 DIAGNOSIS — M25.551 PAIN OF RIGHT HIP: ICD-10-CM

## 2024-08-31 DIAGNOSIS — R73.02 IGT (IMPAIRED GLUCOSE TOLERANCE): ICD-10-CM

## 2024-09-03 RX ORDER — EZETIMIBE 10 MG/1
10 TABLET ORAL DAILY
Qty: 90 TABLET | Refills: 1 | Status: SHIPPED | OUTPATIENT
Start: 2024-09-03

## 2024-09-05 PROBLEM — Z86.59 HISTORY OF DEPRESSION: Status: ACTIVE | Noted: 2024-09-05

## 2024-09-05 PROBLEM — D40.0 NEOPLASM OF UNCERTAIN BEHAVIOR OF PROSTATE: Status: ACTIVE | Noted: 2017-04-26

## 2024-09-05 PROBLEM — T81.49XA SURGICAL SITE INFECTION: Status: ACTIVE | Noted: 2023-11-04

## 2024-09-05 PROBLEM — N40.1 BPH WITH OBSTRUCTION/LOWER URINARY TRACT SYMPTOMS: Status: ACTIVE | Noted: 2017-04-26

## 2024-09-05 PROBLEM — Z86.39 HISTORY OF METABOLIC DISORDER: Status: ACTIVE | Noted: 2024-09-05

## 2024-09-05 PROBLEM — M25.551 PAIN OF RIGHT HIP: Status: RESOLVED | Noted: 2023-12-07 | Resolved: 2024-09-05

## 2024-09-05 PROBLEM — N13.8 BPH WITH OBSTRUCTION/LOWER URINARY TRACT SYMPTOMS: Status: ACTIVE | Noted: 2017-04-26

## 2024-09-24 ENCOUNTER — APPOINTMENT (OUTPATIENT)
Dept: AUDIOLOGY | Facility: CLINIC | Age: 69
End: 2024-09-24
Payer: MEDICARE

## 2024-09-24 ENCOUNTER — APPOINTMENT (OUTPATIENT)
Dept: OTOLARYNGOLOGY | Facility: CLINIC | Age: 69
End: 2024-09-24
Payer: MEDICARE

## 2024-09-24 DIAGNOSIS — G47.33 OBSTRUCTIVE SLEEP APNEA OF ADULT: ICD-10-CM

## 2024-09-24 DIAGNOSIS — H90.3 ASYMMETRICAL SENSORINEURAL HEARING LOSS: Primary | ICD-10-CM

## 2024-10-23 ENCOUNTER — APPOINTMENT (OUTPATIENT)
Dept: OTOLARYNGOLOGY | Facility: CLINIC | Age: 69
End: 2024-10-23
Payer: MEDICARE

## 2024-10-23 ENCOUNTER — APPOINTMENT (OUTPATIENT)
Dept: AUDIOLOGY | Facility: CLINIC | Age: 69
End: 2024-10-23
Payer: MEDICARE

## 2024-10-24 DIAGNOSIS — J31.0 CHRONIC RHINITIS: ICD-10-CM

## 2024-10-24 RX ORDER — FLUTICASONE PROPIONATE 50 MCG
SPRAY, SUSPENSION (ML) NASAL
Qty: 16 G | Refills: 3 | Status: SHIPPED | OUTPATIENT
Start: 2024-10-24

## 2024-11-10 NOTE — PROGRESS NOTES
Subjective   Patient ID: Hernando Stone is a 69 y.o. male who presents for Follow-up.  HPI  This 69-year-old male last seen in the office in August 2023 is being seen in follow-up on sensorineural hearing loss. He does have an asymmetric loss with no functional complaints. He has no subjective sense of tinnitus or difficulties with vertigo. He does use fluticasone nasal spray for congestion and past allergy evaluations were found to be negative for environmental triggers. Swallowing and voice have been stable to date.   Review of Systems  A 12 point ROS has been reviewed and are negative for complaint except what is stated in the history of present illness and/or past medical history as noted in the EMR    Past Medical History:   Diagnosis Date    Personal history of other diseases of the nervous system and sense organs     History of sleep apnea    Personal history of other drug therapy     COVID-19 vaccine series completed    Personal history of other endocrine, nutritional and metabolic disease     History of high cholesterol    Personal history of other mental and behavioral disorders     History of depression          Current Outpatient Medications:     ergocalciferol (Vitamin D-2) 1.25 MG (39647 UT) capsule, Take 1 capsule (50,000 Units) by mouth., Disp: , Rfl:     ezetimibe (Zetia) 10 mg tablet, TAKE ONE TABLET EVERY DAY, Disp: 90 tablet, Rfl: 1    fluticasone (Flonase) 50 mcg/actuation nasal spray, instill 1 to 2 sprays into each nostril once daily if needed, Disp: 16 g, Rfl: 3    omega 3-dha-epa-fish oil (Fish OiL) 300-1,000 mg capsule,delayed release(DR/EC), Take by mouth., Disp: , Rfl:     tadalafil (Cialis) 5 mg tablet, Take 1 tablet (5 mg) by mouth once daily., Disp: 90 tablet, Rfl: 3    venlafaxine XR (Effexor-XR) 150 mg 24 hr capsule, Take 1 capsule (150 mg) by mouth 2 times a day., Disp: , Rfl:     venlafaxine XR (Effexor-XR) 75 mg 24 hr capsule, Take 1 capsule (75 mg) by mouth once daily., Disp: ,  "Rfl:      No Known Allergies    Height 1.727 m (5' 8\"), weight 102 kg (225 lb).    Objective   Physical Exam  EXAMINATION:     GENERAL FAITH.EARANCE: Alert, in no acute distress, normal pitch and clarity of voice, well-developed and nourished, cooperative.     HEAD/FACE: Normocephalic, atraumatic, normal facial movements and strength, no no tenderness to palpation, no lesions noted.     SKIN: Normal turgor, no raised or ulcerative lesions, warm and dry to palpation.     EYES: Extraocular motions intact, no nystagmus noted, pupils equal and reactive to light and accommodation, no conjunctivitis.     EARS: Both ears--external ear anatomy is normal without lesions, auditory canals are patent and without skin abrasions or lesions, hearing is intact to voice, tympanic membranes are intact with no acute inflammation, light reflexes present, no effusions are noted and no mastoid tenderness found to palpation.     NOSE: No external skin lesions are noted, nares are patent, septum is intact, sinuses are nontender to palpation bilaterally, no intranasal lesions or inflammation is noted, nasal valve is normal.     OROPHARYNX/ORAL CAVITY: Oropharynx is not inflamed and is without lesions, mucosa of the oral cavity is intact and without lesions, tongue is midline and mobile, no acute dental disease is noted, TMJs are mobile     LUNG-- NO wheezing or rhonchi normal respiratory effort     HEART-- No venous congestion,  rate and rhythm regular,     NECK: No lymphadenopathy is palpated, carotid pulses are intact, neck is supple with full range of motion, no thyroid abnormalities are noted, trachea is midline, no neck masses are palpated.     LYMPHATICS: No cervical adenopathy or supraclavicular adenopathy is palpated.     NEUROLOGIC/PSYCH; alert and oriented, cranial nerves are grossly intact, gait is without falling, no motor deficits are noted.    His audiogram today continues to show only a mild to moderate high-frequency hearing " loss from 4 to 8000 Hz now symmetric with still intact discrimination ability at 55 dB presentation levels.  Tympanograms were normal  Assessment/Plan   Problem List Items Addressed This Visit    None  Visit Diagnoses         Codes    Bilateral high frequency sensorineural hearing loss    -  Primary H90.3    History of sleep apnea     Z86.69          I discussed the present hearing test findings with the patient. Since the last test there has been no significant change in the hearing of individual frequencies sound. Discrimination ability remains basically unchanged. It would be advised that a yearly audiogram be done unless symptoms develop in regards to progressive loss, new onset vertigo, or changes regarding tinnitus. Avoidance of loud noise without ear protection is advised.      He continues to use his CPAP with benefit and also continues exercise at home which has also been strengthening his balance.  He did have a brother with a sudden hearing loss and I did tell him that this does not mean that he would be at risk for that but if he had any sudden change at any time during the year he should contact the office and be seen.    This patient is advised to follow up with their PCP for all other health care issues and treatment. Dictation was done with dragon transcription and errors in spelling  and diction are possible.    Avelino Adames DMD, MD 11/13/24 3:38 PM

## 2024-11-13 ENCOUNTER — APPOINTMENT (OUTPATIENT)
Dept: AUDIOLOGY | Facility: CLINIC | Age: 69
End: 2024-11-13
Payer: MEDICARE

## 2024-11-13 ENCOUNTER — APPOINTMENT (OUTPATIENT)
Dept: OTOLARYNGOLOGY | Facility: CLINIC | Age: 69
End: 2024-11-13
Payer: MEDICARE

## 2024-11-13 VITALS — BODY MASS INDEX: 34.1 KG/M2 | HEIGHT: 68 IN | WEIGHT: 225 LBS

## 2024-11-13 DIAGNOSIS — H90.3 ASYMMETRICAL SENSORINEURAL HEARING LOSS: Primary | ICD-10-CM

## 2024-11-13 DIAGNOSIS — H90.3 BILATERAL HIGH FREQUENCY SENSORINEURAL HEARING LOSS: Primary | ICD-10-CM

## 2024-11-13 DIAGNOSIS — Z86.69 HISTORY OF SLEEP APNEA: ICD-10-CM

## 2024-11-13 PROCEDURE — 99214 OFFICE O/P EST MOD 30 MIN: CPT | Performed by: OTOLARYNGOLOGY

## 2024-11-13 PROCEDURE — 92557 COMPREHENSIVE HEARING TEST: CPT | Performed by: AUDIOLOGIST

## 2024-11-13 PROCEDURE — 1160F RVW MEDS BY RX/DR IN RCRD: CPT | Performed by: OTOLARYNGOLOGY

## 2024-11-13 PROCEDURE — 4004F PT TOBACCO SCREEN RCVD TLK: CPT | Performed by: OTOLARYNGOLOGY

## 2024-11-13 PROCEDURE — 3008F BODY MASS INDEX DOCD: CPT | Performed by: OTOLARYNGOLOGY

## 2024-11-13 PROCEDURE — 1159F MED LIST DOCD IN RCRD: CPT | Performed by: OTOLARYNGOLOGY

## 2024-11-13 PROCEDURE — 92567 TYMPANOMETRY: CPT | Performed by: AUDIOLOGIST

## 2024-11-13 NOTE — PROGRESS NOTES
"AUDIOMETRIC EVALUATION       Name:  Hernando Stone  :  1955  Age:  69 y.o.  Date of Evaluation:  2024     HISTORY  Hernando Stone was seen today for a hearing evaluation due to known hearing loss, bilaterally. Denies change in hearing but does notice difficulty when communicating in noisy environments or without visual cues    Denies tinnitus, vertigo, aural pain, drainage, fullness.    PROCEDURE:  Otoscopic Evaluation:    RIGHT: Clear ear canal and tympanic membrane visualized.  LEFT:  Clear ear canal and tympanic membrane visualized.    Immittance: Tympanometry (226 Hz probe tone) and Stapedial Acoustic Reflexes Thresholds (ART)(Probe ear):  RIGHT: Normal middle ear pressure, mobility, and ear canal volume. Ipsilateral ART present 500-2000 Hz.  LEFT: Normal middle ear pressure, mobility, and ear canal volume. Ipsilateral ART present 500-2000 Hz.    Pure Tone and Speech Audiometry:    Test Technique: Pure Tone Audiometry via TDH headphones  Test Reliability: good    RIGHT: Normal hearing through 4000 Hz sloping to moderate  sensorineural hearing loss through 8000 Hz. Word Recognition score was excellent using recorded material (NU-6 10-word list ordered by difficulty).   LEFT: Normal hearing through 3000 Hz sloping to moderate  sensorineural hearing loss through 8000 Hz. Word Recognition score was excellent using recorded material (NU-6 10-word list ordered by difficulty).     EVALUATION  See scanned Audiogram in \"Media\".    IMPRESSIONS:  Today's test results indicate normal middle ear function, bilaterally. Normal hearing sloping to a moderate sensorineural hearing loss, bilaterally. Essentially stable hearing thresholds as compared to  testing.    RECOMMENDATIONS:  Continue medical follow-up with physician.  Return for audiologic assessment in conjunction with otologic care or annually.   Use of hearing protective devices whenever exposed to excessively loud or impact noise.    PATIENT EDUCATION: "   Discussed results and recommendations with Hernando Stone.  Questions were addressed and the patient was encouraged to contact our department ([phone]) should concerns arise.    BLAYNE Jewell, CCC-A  Senior Clinical Audiologist    TIME: 230-258

## 2025-01-23 ENCOUNTER — LAB (OUTPATIENT)
Dept: LAB | Facility: LAB | Age: 70
End: 2025-01-23
Payer: MEDICARE

## 2025-01-23 ENCOUNTER — APPOINTMENT (OUTPATIENT)
Dept: PRIMARY CARE | Facility: CLINIC | Age: 70
End: 2025-01-23
Payer: MEDICARE

## 2025-01-23 ENCOUNTER — HOSPITAL ENCOUNTER (OUTPATIENT)
Dept: RADIOLOGY | Facility: CLINIC | Age: 70
Discharge: HOME | End: 2025-01-23
Payer: MEDICARE

## 2025-01-23 VITALS
WEIGHT: 229.4 LBS | SYSTOLIC BLOOD PRESSURE: 130 MMHG | BODY MASS INDEX: 34.88 KG/M2 | HEART RATE: 75 BPM | TEMPERATURE: 96.9 F | OXYGEN SATURATION: 95 % | DIASTOLIC BLOOD PRESSURE: 84 MMHG

## 2025-01-23 DIAGNOSIS — E66.01 OBESITY, MORBID (MULTI): ICD-10-CM

## 2025-01-23 DIAGNOSIS — E78.2 MIXED HYPERLIPIDEMIA: ICD-10-CM

## 2025-01-23 DIAGNOSIS — N40.1 BPH WITH OBSTRUCTION/LOWER URINARY TRACT SYMPTOMS: ICD-10-CM

## 2025-01-23 DIAGNOSIS — G89.29 CHRONIC HIP PAIN, LEFT: ICD-10-CM

## 2025-01-23 DIAGNOSIS — M25.552 CHRONIC HIP PAIN, LEFT: ICD-10-CM

## 2025-01-23 DIAGNOSIS — R73.02 IGT (IMPAIRED GLUCOSE TOLERANCE): ICD-10-CM

## 2025-01-23 DIAGNOSIS — Z00.00 WELLNESS EXAMINATION: ICD-10-CM

## 2025-01-23 DIAGNOSIS — N13.8 BPH WITH OBSTRUCTION/LOWER URINARY TRACT SYMPTOMS: ICD-10-CM

## 2025-01-23 DIAGNOSIS — R73.02 IGT (IMPAIRED GLUCOSE TOLERANCE): Primary | ICD-10-CM

## 2025-01-23 LAB
EST. AVERAGE GLUCOSE BLD GHB EST-MCNC: 140 MG/DL
HBA1C MFR BLD: 6.5 %

## 2025-01-23 PROCEDURE — 73502 X-RAY EXAM HIP UNI 2-3 VIEWS: CPT | Mod: LEFT SIDE | Performed by: RADIOLOGY

## 2025-01-23 PROCEDURE — 80061 LIPID PANEL: CPT

## 2025-01-23 PROCEDURE — 1160F RVW MEDS BY RX/DR IN RCRD: CPT | Performed by: INTERNAL MEDICINE

## 2025-01-23 PROCEDURE — G2211 COMPLEX E/M VISIT ADD ON: HCPCS | Performed by: INTERNAL MEDICINE

## 2025-01-23 PROCEDURE — 83036 HEMOGLOBIN GLYCOSYLATED A1C: CPT

## 2025-01-23 PROCEDURE — 99213 OFFICE O/P EST LOW 20 MIN: CPT | Performed by: INTERNAL MEDICINE

## 2025-01-23 PROCEDURE — 80076 HEPATIC FUNCTION PANEL: CPT

## 2025-01-23 PROCEDURE — 1159F MED LIST DOCD IN RCRD: CPT | Performed by: INTERNAL MEDICINE

## 2025-01-23 PROCEDURE — 73502 X-RAY EXAM HIP UNI 2-3 VIEWS: CPT | Mod: LT

## 2025-01-24 LAB
ALBUMIN SERPL BCP-MCNC: 4.3 G/DL (ref 3.4–5)
ALP SERPL-CCNC: 53 U/L (ref 33–136)
ALT SERPL W P-5'-P-CCNC: 35 U/L (ref 10–52)
AST SERPL W P-5'-P-CCNC: 26 U/L (ref 9–39)
BILIRUB DIRECT SERPL-MCNC: 0.1 MG/DL (ref 0–0.3)
BILIRUB SERPL-MCNC: 0.5 MG/DL (ref 0–1.2)
CHOLEST SERPL-MCNC: 201 MG/DL (ref 0–199)
CHOLESTEROL/HDL RATIO: 3.2
HDLC SERPL-MCNC: 62.5 MG/DL
LDLC SERPL CALC-MCNC: 120 MG/DL
NON HDL CHOLESTEROL: 139 MG/DL (ref 0–149)
PROT SERPL-MCNC: 7.1 G/DL (ref 6.4–8.2)
TRIGL SERPL-MCNC: 95 MG/DL (ref 0–149)
VLDL: 19 MG/DL (ref 0–40)

## 2025-01-25 DIAGNOSIS — J31.0 CHRONIC RHINITIS: ICD-10-CM

## 2025-01-25 DIAGNOSIS — Z00.00 WELLNESS EXAMINATION: ICD-10-CM

## 2025-01-25 DIAGNOSIS — M25.551 PAIN OF RIGHT HIP: ICD-10-CM

## 2025-01-25 DIAGNOSIS — E78.2 MIXED HYPERLIPIDEMIA: ICD-10-CM

## 2025-01-25 DIAGNOSIS — R73.02 IGT (IMPAIRED GLUCOSE TOLERANCE): ICD-10-CM

## 2025-01-25 DIAGNOSIS — G47.33 OBSTRUCTIVE SLEEP APNEA OF ADULT: ICD-10-CM

## 2025-01-25 DIAGNOSIS — R73.03 PREDIABETES: ICD-10-CM

## 2025-01-27 RX ORDER — FLUTICASONE PROPIONATE 50 MCG
SPRAY, SUSPENSION (ML) NASAL
Qty: 16 G | Refills: 3 | Status: SHIPPED | OUTPATIENT
Start: 2025-01-27

## 2025-01-27 RX ORDER — EZETIMIBE 10 MG/1
10 TABLET ORAL DAILY
Qty: 90 TABLET | Refills: 1 | Status: SHIPPED | OUTPATIENT
Start: 2025-01-27

## 2025-06-26 ENCOUNTER — TELEPHONE (OUTPATIENT)
Dept: PRIMARY CARE | Facility: CLINIC | Age: 70
End: 2025-06-26
Payer: MEDICARE

## 2025-06-26 NOTE — TELEPHONE ENCOUNTER
Pt asking if he has coronary artery disease? States he was informed by [facility] that they reached out to his providers for a report of what he is being treated for, and that condition was listed to them. (He gave okay for them to get info)    He is trying to find out for sure, since it would be news to him if he has CAD?  Please advise.

## 2025-07-03 DIAGNOSIS — J31.0 CHRONIC RHINITIS: ICD-10-CM

## 2025-07-03 RX ORDER — FLUTICASONE PROPIONATE 50 MCG
SPRAY, SUSPENSION (ML) NASAL
Qty: 16 G | Refills: 3 | Status: SHIPPED | OUTPATIENT
Start: 2025-07-03

## 2025-07-24 ENCOUNTER — APPOINTMENT (OUTPATIENT)
Dept: PRIMARY CARE | Facility: CLINIC | Age: 70
End: 2025-07-24
Payer: MEDICARE

## 2025-07-24 VITALS
OXYGEN SATURATION: 98 % | SYSTOLIC BLOOD PRESSURE: 122 MMHG | BODY MASS INDEX: 33.8 KG/M2 | HEART RATE: 64 BPM | DIASTOLIC BLOOD PRESSURE: 80 MMHG | WEIGHT: 223 LBS | HEIGHT: 68 IN

## 2025-07-24 DIAGNOSIS — Z13.6 SCREENING FOR CARDIOVASCULAR CONDITION: Primary | ICD-10-CM

## 2025-07-24 DIAGNOSIS — F33.42 MAJOR DEPRESSIVE DISORDER, RECURRENT, IN FULL REMISSION: ICD-10-CM

## 2025-07-24 DIAGNOSIS — R79.9 ABNORMAL FINDING OF BLOOD CHEMISTRY, UNSPECIFIED: ICD-10-CM

## 2025-07-24 DIAGNOSIS — R73.02 IGT (IMPAIRED GLUCOSE TOLERANCE): ICD-10-CM

## 2025-07-24 DIAGNOSIS — Z00.00 MEDICARE ANNUAL WELLNESS VISIT, SUBSEQUENT: ICD-10-CM

## 2025-07-24 DIAGNOSIS — Z00.00 WELLNESS EXAMINATION: ICD-10-CM

## 2025-07-24 DIAGNOSIS — N52.01 ERECTILE DYSFUNCTION DUE TO ARTERIAL INSUFFICIENCY: ICD-10-CM

## 2025-07-24 DIAGNOSIS — N40.1 BPH WITH OBSTRUCTION/LOWER URINARY TRACT SYMPTOMS: ICD-10-CM

## 2025-07-24 DIAGNOSIS — R60.0 EDEMA LEG: ICD-10-CM

## 2025-07-24 DIAGNOSIS — Z12.5 PROSTATE CANCER SCREENING: ICD-10-CM

## 2025-07-24 DIAGNOSIS — G47.33 OBSTRUCTIVE SLEEP APNEA OF ADULT: ICD-10-CM

## 2025-07-24 DIAGNOSIS — E78.2 MIXED HYPERLIPIDEMIA: ICD-10-CM

## 2025-07-24 DIAGNOSIS — H90.3 ASYMMETRICAL SENSORINEURAL HEARING LOSS: ICD-10-CM

## 2025-07-24 DIAGNOSIS — N13.8 BPH WITH OBSTRUCTION/LOWER URINARY TRACT SYMPTOMS: ICD-10-CM

## 2025-07-24 DIAGNOSIS — H04.123 DRY EYE SYNDROME OF BILATERAL LACRIMAL GLANDS: ICD-10-CM

## 2025-07-24 DIAGNOSIS — M16.12 ARTHRITIS OF LEFT HIP: ICD-10-CM

## 2025-07-24 PROCEDURE — 99213 OFFICE O/P EST LOW 20 MIN: CPT | Performed by: INTERNAL MEDICINE

## 2025-07-24 PROCEDURE — 3008F BODY MASS INDEX DOCD: CPT | Performed by: INTERNAL MEDICINE

## 2025-07-24 PROCEDURE — 1170F FXNL STATUS ASSESSED: CPT | Performed by: INTERNAL MEDICINE

## 2025-07-24 PROCEDURE — 1159F MED LIST DOCD IN RCRD: CPT | Performed by: INTERNAL MEDICINE

## 2025-07-24 PROCEDURE — G0439 PPPS, SUBSEQ VISIT: HCPCS | Performed by: INTERNAL MEDICINE

## 2025-07-24 PROCEDURE — 1160F RVW MEDS BY RX/DR IN RCRD: CPT | Performed by: INTERNAL MEDICINE

## 2025-07-24 PROCEDURE — 1124F ACP DISCUSS-NO DSCNMKR DOCD: CPT | Performed by: INTERNAL MEDICINE

## 2025-07-24 RX ORDER — VENLAFAXINE HYDROCHLORIDE 150 MG/1
150 CAPSULE, EXTENDED RELEASE ORAL 2 TIMES DAILY
Qty: 180 CAPSULE | Refills: 0 | Status: SHIPPED | OUTPATIENT
Start: 2025-07-24

## 2025-07-24 RX ORDER — VENLAFAXINE HYDROCHLORIDE 75 MG/1
75 CAPSULE, EXTENDED RELEASE ORAL DAILY
Qty: 90 CAPSULE | Refills: 0 | Status: SHIPPED | OUTPATIENT
Start: 2025-07-24

## 2025-07-24 ASSESSMENT — ENCOUNTER SYMPTOMS
CHEST TIGHTNESS: 0
ALLERGIC/IMMUNOLOGIC NEGATIVE: 1
EYE ITCHING: 0
LIGHT-HEADEDNESS: 0
EYE PAIN: 0
APNEA: 0
FEVER: 0
ACTIVITY CHANGE: 0
NUMBNESS: 0
HEMATOLOGIC/LYMPHATIC NEGATIVE: 1
DIZZINESS: 0
STRIDOR: 0
FATIGUE: 0
PSYCHIATRIC NEGATIVE: 1
DIAPHORESIS: 0
EYE REDNESS: 0
EYE DISCHARGE: 0
APPETITE CHANGE: 0
ENDOCRINE NEGATIVE: 1
UNEXPECTED WEIGHT CHANGE: 0
ARTHRALGIAS: 0
SHORTNESS OF BREATH: 0
FACIAL ASYMMETRY: 0
CHILLS: 0
GASTROINTESTINAL NEGATIVE: 1
PHOTOPHOBIA: 0
CHOKING: 0
PALPITATIONS: 0
WHEEZING: 0
COUGH: 0
HEADACHES: 0

## 2025-07-24 ASSESSMENT — ACTIVITIES OF DAILY LIVING (ADL)
GROCERY_SHOPPING: INDEPENDENT
MANAGING_FINANCES: INDEPENDENT
BATHING: INDEPENDENT
TAKING_MEDICATION: INDEPENDENT
DRESSING: INDEPENDENT
DOING_HOUSEWORK: INDEPENDENT

## 2025-07-24 ASSESSMENT — PATIENT HEALTH QUESTIONNAIRE - PHQ9
1. LITTLE INTEREST OR PLEASURE IN DOING THINGS: NOT AT ALL
SUM OF ALL RESPONSES TO PHQ9 QUESTIONS 1 AND 2: 0
2. FEELING DOWN, DEPRESSED OR HOPELESS: NOT AT ALL

## 2025-07-24 NOTE — ASSESSMENT & PLAN NOTE
Good on meds  Orders:    venlafaxine XR (Effexor-XR) 150 mg 24 hr capsule; Take 1 capsule (150 mg) by mouth 2 times a day.    venlafaxine XR (Effexor-XR) 75 mg 24 hr capsule; Take 1 capsule (75 mg) by mouth once daily.    TSH with reflex to Free T4 if abnormal; Future    Urinalysis with Reflex Culture and Microscopic; Future    Hemoglobin A1C; Future

## 2025-07-24 NOTE — ASSESSMENT & PLAN NOTE
Dr Harrison   Orders:    venlafaxine XR (Effexor-XR) 150 mg 24 hr capsule; Take 1 capsule (150 mg) by mouth 2 times a day.    venlafaxine XR (Effexor-XR) 75 mg 24 hr capsule; Take 1 capsule (75 mg) by mouth once daily.    TSH with reflex to Free T4 if abnormal; Future    Urinalysis with Reflex Culture and Microscopic; Future    Hemoglobin A1C; Future

## 2025-07-24 NOTE — ASSESSMENT & PLAN NOTE
Ok compliant  Orders:    venlafaxine XR (Effexor-XR) 150 mg 24 hr capsule; Take 1 capsule (150 mg) by mouth 2 times a day.    venlafaxine XR (Effexor-XR) 75 mg 24 hr capsule; Take 1 capsule (75 mg) by mouth once daily.    TSH with reflex to Free T4 if abnormal; Future    Urinalysis with Reflex Culture and Microscopic; Future    Hemoglobin A1C; Future

## 2025-07-24 NOTE — ASSESSMENT & PLAN NOTE
Orders:    venlafaxine XR (Effexor-XR) 150 mg 24 hr capsule; Take 1 capsule (150 mg) by mouth 2 times a day.    venlafaxine XR (Effexor-XR) 75 mg 24 hr capsule; Take 1 capsule (75 mg) by mouth once daily.    TSH with reflex to Free T4 if abnormal; Future    Urinalysis with Reflex Culture and Microscopic; Future    Hemoglobin A1C; Future

## 2025-07-24 NOTE — ASSESSMENT & PLAN NOTE
Time for labs to evaluate results     Orders:    venlafaxine XR (Effexor-XR) 150 mg 24 hr capsule; Take 1 capsule (150 mg) by mouth 2 times a day.    venlafaxine XR (Effexor-XR) 75 mg 24 hr capsule; Take 1 capsule (75 mg) by mouth once daily.    TSH with reflex to Free T4 if abnormal; Future    Urinalysis with Reflex Culture and Microscopic; Future    Hemoglobin A1C; Future

## 2025-07-24 NOTE — PROGRESS NOTES
Subjective   Reason for Visit: Hernando Stone is an 70 y.o. male here for a Medicare Wellness visit.               HPI The patient is doing well , feels well, no specific complaints.   Tolerating and taking med's with no significant side effects.   No other issues noted on questions.     Mood is good with meds overall   Needs psyc. Meds.      Past Medical History:   Diagnosis Date   Depression   Hyperlipemia   Obesity     Past Surgical History:   Procedure Laterality Date   COLONOSCOPY 2017   HAND DEBRIDEMENT Right 11/04/2023   Irrigation and Debridement Right Hand - Right   TONSILLECTOMY (HISTORICAL)       Social History:  Social History     Socioeconomic History   Marital status:    Spouse name: Not on file   Number of children: Not on file   Years of education: Not on file   Highest education level: Not on file   Occupational History   Not on file   Tobacco Use   Smoking status: Light Smoker   Smokeless tobacco: Never   Substance and Sexual Activity   Alcohol use: Yes   Drug use: No   Sexual activity: Not on file   Other Topics Concern   Not on file   Social History Narrative   Not on file     Social Determinants of Health     Financial Resource Strain: Not on file   Food Insecurity: Not on file   Transportation Needs: Not on file   Physical Activity: Not on file   Stress: Not on file   Social Connections: Not on file   Intimate Partner Violence: Not on file   Housing Stability: Not on file     Family History:  Family History   Problem Relation Name Age of Onset   Cancer Mother   Cancer Father   colon and stomach       Medications  Prior to Admission medications   Medication Sig Start Date End Date Taking? Authorizing Provider   ascorbic acid (Vitamin C) 250 MG tablet Take 250 mg by mouth in the morning. Historical Provider, MD   ergocalciferol (Vitamin D-2) 1.25 MG (63397 UT) capsule Take 50,000 Units by mouth 1 (one) time per week. Historical Provider, MD   ezetimibe (Zetia) 10 MG tablet 4/5/17 Historical  Provider, MD   fluticasone (Flonase) 50 MCG/ACT nasal spray instill 1 to 2 sprays into each nostril once daily if needed 2/5/24 Historical Provider, MD   Multiple Vitamin (multivitamin) capsule Take 1 capsule by mouth daily. Historical Provider, MD   tadalafil (Cialis) 5 MG tablet Take 1 tablet (5 mg) by mouth daily. 9/30/24 9/30/25 Andrae Harrison MD   venlafaxine XR (Effexor XR) 150 MG 24 hr capsule Take 150 mg by mouth 2 times daily. Historical Provider, MD   venlafaxine XR (Effexor XR) 75 MG 24 hr capsule 75 mg. 12/4/23 Historical Provider, MD   tadalafil (Cialis) 5 MG tablet Take 1 tablet (5 mg) by mouth daily. 3/14/24 9/30/24 Andrae Harrison MD     Patient Care Team:  Julio Cesar Miguel MD as PCP - General  Julio Cesar Miguel MD as PCP - [facility] Attributed Provider     Review of Systems   Constitutional:  Negative for activity change, appetite change, chills, diaphoresis, fatigue, fever and unexpected weight change.   HENT: Negative.     Eyes:  Negative for photophobia, pain, discharge, redness, itching and visual disturbance.   Respiratory:  Negative for apnea, cough, choking, chest tightness, shortness of breath, wheezing and stridor.    Cardiovascular:  Negative for chest pain, palpitations and leg swelling.   Gastrointestinal: Negative.    Endocrine: Negative.    Genitourinary: Negative.    Musculoskeletal:  Negative for arthralgias.   Skin: Negative.    Allergic/Immunologic: Negative.    Neurological:  Negative for dizziness, facial asymmetry, light-headedness, numbness and headaches.   Hematological: Negative.    Psychiatric/Behavioral: Negative.         Objective   Vitals:  There were no vitals taken for this visit.      Physical Exam  Constitutional:       Appearance: Normal appearance. He is obese.   HENT:      Head: Normocephalic and atraumatic.      Right Ear: Tympanic membrane normal.      Left Ear: Tympanic membrane normal.      Nose: Nose normal.     Eyes:      Extraocular Movements: Extraocular  movements intact.      Conjunctiva/sclera: Conjunctivae normal.      Pupils: Pupils are equal, round, and reactive to light.       Cardiovascular:      Rate and Rhythm: Normal rate and regular rhythm.      Pulses: Normal pulses.      Heart sounds: Normal heart sounds.   Pulmonary:      Effort: Pulmonary effort is normal.      Breath sounds: Normal breath sounds.   Abdominal:      General: Abdomen is flat. Bowel sounds are normal.      Palpations: Abdomen is soft.     Musculoskeletal:         General: Normal range of motion.      Cervical back: Normal range of motion and neck supple.     Skin:     General: Skin is warm and dry.     Neurological:      General: No focal deficit present.      Mental Status: He is oriented to person, place, and time. Mental status is at baseline.     Psychiatric:         Mood and Affect: Mood normal.         Behavior: Behavior normal.         Thought Content: Thought content normal.         Judgment: Judgment normal.         Assessment & Plan  Medicare annual wellness visit, subsequent    Orders:    venlafaxine XR (Effexor-XR) 150 mg 24 hr capsule; Take 1 capsule (150 mg) by mouth 2 times a day.    venlafaxine XR (Effexor-XR) 75 mg 24 hr capsule; Take 1 capsule (75 mg) by mouth once daily.    TSH with reflex to Free T4 if abnormal; Future    Urinalysis with Reflex Culture and Microscopic; Future    Hemoglobin A1C; Future    Screening for cardiovascular condition  Cardiac ct   Orders:    CT cardiac scoring wo IV contrast; Future    venlafaxine XR (Effexor-XR) 150 mg 24 hr capsule; Take 1 capsule (150 mg) by mouth 2 times a day.    venlafaxine XR (Effexor-XR) 75 mg 24 hr capsule; Take 1 capsule (75 mg) by mouth once daily.    TSH with reflex to Free T4 if abnormal; Future    Urinalysis with Reflex Culture and Microscopic; Future    Hemoglobin A1C; Future    IGT (impaired glucose tolerance)  Time for labs to evaluate results     Orders:    venlafaxine XR (Effexor-XR) 150 mg 24 hr capsule;  Take 1 capsule (150 mg) by mouth 2 times a day.    venlafaxine XR (Effexor-XR) 75 mg 24 hr capsule; Take 1 capsule (75 mg) by mouth once daily.    TSH with reflex to Free T4 if abnormal; Future    Urinalysis with Reflex Culture and Microscopic; Future    Hemoglobin A1C; Future    Mixed hyperlipidemia  Time for labs to evaluate results     Orders:    venlafaxine XR (Effexor-XR) 150 mg 24 hr capsule; Take 1 capsule (150 mg) by mouth 2 times a day.    venlafaxine XR (Effexor-XR) 75 mg 24 hr capsule; Take 1 capsule (75 mg) by mouth once daily.    TSH with reflex to Free T4 if abnormal; Future    Urinalysis with Reflex Culture and Microscopic; Future    Hemoglobin A1C; Future    BPH with obstruction/lower urinary tract symptoms  Dr Harrison   Orders:    venlafaxine XR (Effexor-XR) 150 mg 24 hr capsule; Take 1 capsule (150 mg) by mouth 2 times a day.    venlafaxine XR (Effexor-XR) 75 mg 24 hr capsule; Take 1 capsule (75 mg) by mouth once daily.    TSH with reflex to Free T4 if abnormal; Future    Urinalysis with Reflex Culture and Microscopic; Future    Hemoglobin A1C; Future    Wellness examination    Orders:    venlafaxine XR (Effexor-XR) 150 mg 24 hr capsule; Take 1 capsule (150 mg) by mouth 2 times a day.    venlafaxine XR (Effexor-XR) 75 mg 24 hr capsule; Take 1 capsule (75 mg) by mouth once daily.    CBC; Future    Lipid Panel; Future    Comprehensive Metabolic Panel; Future    TSH with reflex to Free T4 if abnormal; Future    Urinalysis with Reflex Culture and Microscopic; Future    Hemoglobin A1C; Future    Major depressive disorder, recurrent, in full remission  Good on meds  Orders:    venlafaxine XR (Effexor-XR) 150 mg 24 hr capsule; Take 1 capsule (150 mg) by mouth 2 times a day.    venlafaxine XR (Effexor-XR) 75 mg 24 hr capsule; Take 1 capsule (75 mg) by mouth once daily.    TSH with reflex to Free T4 if abnormal; Future    Urinalysis with Reflex Culture and Microscopic; Future    Hemoglobin A1C;  Future    Asymmetrical sensorineural hearing loss    Orders:    venlafaxine XR (Effexor-XR) 150 mg 24 hr capsule; Take 1 capsule (150 mg) by mouth 2 times a day.    venlafaxine XR (Effexor-XR) 75 mg 24 hr capsule; Take 1 capsule (75 mg) by mouth once daily.    TSH with reflex to Free T4 if abnormal; Future    Urinalysis with Reflex Culture and Microscopic; Future    Hemoglobin A1C; Future    Erectile dysfunction due to arterial insufficiency    Orders:    venlafaxine XR (Effexor-XR) 150 mg 24 hr capsule; Take 1 capsule (150 mg) by mouth 2 times a day.    venlafaxine XR (Effexor-XR) 75 mg 24 hr capsule; Take 1 capsule (75 mg) by mouth once daily.    TSH with reflex to Free T4 if abnormal; Future    Urinalysis with Reflex Culture and Microscopic; Future    Hemoglobin A1C; Future    Obstructive sleep apnea of adult  Ok compliant  Orders:    venlafaxine XR (Effexor-XR) 150 mg 24 hr capsule; Take 1 capsule (150 mg) by mouth 2 times a day.    venlafaxine XR (Effexor-XR) 75 mg 24 hr capsule; Take 1 capsule (75 mg) by mouth once daily.    TSH with reflex to Free T4 if abnormal; Future    Urinalysis with Reflex Culture and Microscopic; Future    Hemoglobin A1C; Future    Dry eye syndrome of bilateral lacrimal glands    Orders:    venlafaxine XR (Effexor-XR) 150 mg 24 hr capsule; Take 1 capsule (150 mg) by mouth 2 times a day.    venlafaxine XR (Effexor-XR) 75 mg 24 hr capsule; Take 1 capsule (75 mg) by mouth once daily.    TSH with reflex to Free T4 if abnormal; Future    Urinalysis with Reflex Culture and Microscopic; Future    Hemoglobin A1C; Future    Prostate cancer screening    Orders:    venlafaxine XR (Effexor-XR) 150 mg 24 hr capsule; Take 1 capsule (150 mg) by mouth 2 times a day.    venlafaxine XR (Effexor-XR) 75 mg 24 hr capsule; Take 1 capsule (75 mg) by mouth once daily.    Prostate Specific Antigen, Screen; Future    TSH with reflex to Free T4 if abnormal; Future    Urinalysis with Reflex Culture and  Microscopic; Future    Hemoglobin A1C; Future    Abnormal finding of blood chemistry, unspecified  Almost dm   Orders:    CBC; Future    Arthritis of left hip  MILD on  xray        Edema leg  Mild b

## 2025-07-24 NOTE — ASSESSMENT & PLAN NOTE
Cardiac ct   Orders:    CT cardiac scoring wo IV contrast; Future    venlafaxine XR (Effexor-XR) 150 mg 24 hr capsule; Take 1 capsule (150 mg) by mouth 2 times a day.    venlafaxine XR (Effexor-XR) 75 mg 24 hr capsule; Take 1 capsule (75 mg) by mouth once daily.    TSH with reflex to Free T4 if abnormal; Future    Urinalysis with Reflex Culture and Microscopic; Future    Hemoglobin A1C; Future

## 2025-07-24 NOTE — ASSESSMENT & PLAN NOTE
Orders:    venlafaxine XR (Effexor-XR) 150 mg 24 hr capsule; Take 1 capsule (150 mg) by mouth 2 times a day.    venlafaxine XR (Effexor-XR) 75 mg 24 hr capsule; Take 1 capsule (75 mg) by mouth once daily.    CBC; Future    Lipid Panel; Future    Comprehensive Metabolic Panel; Future    TSH with reflex to Free T4 if abnormal; Future    Urinalysis with Reflex Culture and Microscopic; Future    Hemoglobin A1C; Future

## 2025-07-24 NOTE — ASSESSMENT & PLAN NOTE
Orders:    venlafaxine XR (Effexor-XR) 150 mg 24 hr capsule; Take 1 capsule (150 mg) by mouth 2 times a day.    venlafaxine XR (Effexor-XR) 75 mg 24 hr capsule; Take 1 capsule (75 mg) by mouth once daily.    Prostate Specific Antigen, Screen; Future    TSH with reflex to Free T4 if abnormal; Future    Urinalysis with Reflex Culture and Microscopic; Future    Hemoglobin A1C; Future

## 2025-07-26 LAB
ALBUMIN SERPL-MCNC: 4.5 G/DL (ref 3.6–5.1)
ALP SERPL-CCNC: 50 U/L (ref 35–144)
ALT SERPL-CCNC: 27 U/L (ref 9–46)
ANION GAP SERPL CALCULATED.4IONS-SCNC: 11 MMOL/L (CALC) (ref 7–17)
APPEARANCE UR: CLEAR
AST SERPL-CCNC: 23 U/L (ref 10–35)
BACTERIA #/AREA URNS HPF: ABNORMAL /HPF
BACTERIA UR CULT: ABNORMAL
BACTERIA UR CULT: ABNORMAL
BILIRUB SERPL-MCNC: 0.4 MG/DL (ref 0.2–1.2)
BILIRUB UR QL STRIP: NEGATIVE
BUN SERPL-MCNC: 20 MG/DL (ref 7–25)
CALCIUM SERPL-MCNC: 9.6 MG/DL (ref 8.6–10.3)
CHLORIDE SERPL-SCNC: 105 MMOL/L (ref 98–110)
CHOLEST SERPL-MCNC: 198 MG/DL
CHOLEST/HDLC SERPL: 3.1 (CALC)
CO2 SERPL-SCNC: 21 MMOL/L (ref 20–32)
COLOR UR: YELLOW
CREAT SERPL-MCNC: 0.99 MG/DL (ref 0.7–1.28)
EGFRCR SERPLBLD CKD-EPI 2021: 82 ML/MIN/1.73M2
ERYTHROCYTE [DISTWIDTH] IN BLOOD BY AUTOMATED COUNT: 13 % (ref 11–15)
EST. AVERAGE GLUCOSE BLD GHB EST-MCNC: 143 MG/DL
EST. AVERAGE GLUCOSE BLD GHB EST-SCNC: 7.9 MMOL/L
GLUCOSE SERPL-MCNC: 117 MG/DL (ref 65–99)
GLUCOSE UR QL STRIP: NEGATIVE
HBA1C MFR BLD: 6.6 %
HCT VFR BLD AUTO: 46 % (ref 38.5–50)
HDLC SERPL-MCNC: 64 MG/DL
HGB BLD-MCNC: 15.1 G/DL (ref 13.2–17.1)
HGB UR QL STRIP: NEGATIVE
HYALINE CASTS #/AREA URNS LPF: ABNORMAL /LPF
KETONES UR QL STRIP: NEGATIVE
LDLC SERPL CALC-MCNC: 113 MG/DL (CALC)
LEUKOCYTE ESTERASE UR QL STRIP: ABNORMAL
MCH RBC QN AUTO: 28.5 PG (ref 27–33)
MCHC RBC AUTO-ENTMCNC: 32.8 G/DL (ref 32–36)
MCV RBC AUTO: 87 FL (ref 80–100)
NITRITE UR QL STRIP: NEGATIVE
NONHDLC SERPL-MCNC: 134 MG/DL (CALC)
PH UR STRIP: 5.5 [PH] (ref 5–8)
PLATELET # BLD AUTO: 231 THOUSAND/UL (ref 140–400)
PMV BLD REES-ECKER: 11.8 FL (ref 7.5–12.5)
POTASSIUM SERPL-SCNC: 4.6 MMOL/L (ref 3.5–5.3)
PROT SERPL-MCNC: 7.3 G/DL (ref 6.1–8.1)
PROT UR QL STRIP: NEGATIVE
RBC # BLD AUTO: 5.29 MILLION/UL (ref 4.2–5.8)
RBC #/AREA URNS HPF: ABNORMAL /HPF
SERVICE CMNT-IMP: ABNORMAL
SODIUM SERPL-SCNC: 137 MMOL/L (ref 135–146)
SP GR UR STRIP: 1.02 (ref 1–1.03)
SQUAMOUS #/AREA URNS HPF: ABNORMAL /HPF
TRIGL SERPL-MCNC: 99 MG/DL
TSH SERPL-ACNC: 1.72 MIU/L (ref 0.4–4.5)
URATE SERPL-MCNC: 6.8 MG/DL (ref 4–8)
WBC # BLD AUTO: 5.6 THOUSAND/UL (ref 3.8–10.8)
WBC #/AREA URNS HPF: ABNORMAL /HPF

## 2025-07-29 ENCOUNTER — APPOINTMENT (OUTPATIENT)
Dept: PRIMARY CARE | Facility: CLINIC | Age: 70
End: 2025-07-29
Payer: MEDICARE

## 2025-09-01 DIAGNOSIS — M25.551 PAIN OF RIGHT HIP: ICD-10-CM

## 2025-09-01 DIAGNOSIS — R73.03 PREDIABETES: ICD-10-CM

## 2025-09-01 DIAGNOSIS — G47.33 OBSTRUCTIVE SLEEP APNEA OF ADULT: ICD-10-CM

## 2025-09-01 DIAGNOSIS — Z00.00 WELLNESS EXAMINATION: ICD-10-CM

## 2025-09-01 DIAGNOSIS — R73.02 IGT (IMPAIRED GLUCOSE TOLERANCE): ICD-10-CM

## 2025-09-01 DIAGNOSIS — E78.2 MIXED HYPERLIPIDEMIA: ICD-10-CM

## 2025-09-02 RX ORDER — EZETIMIBE 10 MG/1
10 TABLET ORAL DAILY
Qty: 90 TABLET | Refills: 1 | Status: SHIPPED | OUTPATIENT
Start: 2025-09-02

## 2025-11-19 ENCOUNTER — APPOINTMENT (OUTPATIENT)
Dept: OTOLARYNGOLOGY | Facility: CLINIC | Age: 70
End: 2025-11-19
Payer: MEDICARE

## 2025-11-19 ENCOUNTER — APPOINTMENT (OUTPATIENT)
Dept: AUDIOLOGY | Facility: CLINIC | Age: 70
End: 2025-11-19
Payer: MEDICARE

## 2026-01-29 ENCOUNTER — APPOINTMENT (OUTPATIENT)
Dept: PRIMARY CARE | Facility: CLINIC | Age: 71
End: 2026-01-29
Payer: MEDICARE